# Patient Record
Sex: MALE
[De-identification: names, ages, dates, MRNs, and addresses within clinical notes are randomized per-mention and may not be internally consistent; named-entity substitution may affect disease eponyms.]

---

## 2018-12-18 ENCOUNTER — HOSPITAL ENCOUNTER (EMERGENCY)
Dept: HOSPITAL 97 - ER | Age: 43
Discharge: HOME | End: 2018-12-18
Payer: SELF-PAY

## 2018-12-18 DIAGNOSIS — Z23: ICD-10-CM

## 2018-12-18 DIAGNOSIS — S60.414A: ICD-10-CM
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
## 2018-12-18 DIAGNOSIS — S60.141A: ICD-10-CM

## 2018-12-18 DIAGNOSIS — S62.634A: Primary | ICD-10-CM

## 2018-12-18 DIAGNOSIS — E11.9: ICD-10-CM
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
## 2018-12-18 PROCEDURE — 0H9QXZZ DRAINAGE OF FINGER NAIL, EXTERNAL APPROACH: ICD-10-PCS

## 2018-12-18 PROCEDURE — 96372 THER/PROPH/DIAG INJ SC/IM: CPT

## 2018-12-18 PROCEDURE — 90714 TD VACC NO PRESV 7 YRS+ IM: CPT

## 2018-12-18 PROCEDURE — 99283 EMERGENCY DEPT VISIT LOW MDM: CPT

## 2018-12-18 NOTE — RAD REPORT
EXAM DESCRIPTION:  RAD - Hand Right 3 View - 12/18/2018 2:35 pm

 

CLINICAL HISTORY:  Left hand pain, smash injury to the distal fourth digit

 

COMPARISON:  None.

 

FINDINGS:  Tuft of the fourth distal phalanx shows a fracture with no distraction or angulation. IP j
oints of the fourth digit are normal. Soft tissue swelling is present in the distal finger with no ai
r or foreign body.

 

 Elsewhere no additional fracture changes seen. No bone or joint acute finding.

 

IMPRESSION:  Fourth digit tuft fracture with no distraction or angulation.

 

No foreign body in the soft tissues.

## 2018-12-18 NOTE — ER
Nurse's Notes                                                                                     

 [facility]                                                                

Name: Stan Meade                                                                               

Age: 43 yrs                                                                                       

Sex: Male                                                                                         

: 1975                                                                                   

MRN: [id number]                                                                                   

Arrival Date: 2018                                                                          

Time: 13:22                                                                                       

Account#: [id number]                                                                            

Bed 12                                                                                            

Private MD: Out, [facility]                                                                    

Diagnosis: Abrasion of right ring finger;Contusion of right ring finger with damage to nail;distal

  tuft fracture right ring finger                                                                 

                                                                                                  

Presentation:                                                                                     

                                                                                             

13:34 Presenting complaint: Patient states: smashed ring finger on right hand between a door  iw  

      and a piece of iron, happened about an hour ago, swelling and bruising noted to finger      

      nail, bleeding is controlled. Transition of care: patient was not received from another     

      setting of care. Onset of symptoms was 2018. Risk Assessment: Do you want      

      to hurt yourself or someone else? Patient reports no desire to harm self or others.         

      Initial Sepsis Screen: Does the patient meet any 2 criteria? No. Patient's initial          

      sepsis screen is negative. Does the patient have a suspected source of infection? No.       

      Patient's initial sepsis screen is negative. Care prior to arrival: None.                   

13:34 Method Of Arrival: Ambulatory                                                           iw  

13:34 Acuity: JONE 4                                                                           iw  

                                                                                                  

Historical:                                                                                       

- Allergies:                                                                                      

13:37 NKA;                                                                                    iw  

- Home Meds:                                                                                      

13:37 Janumet  mg oral tab 1 tab 2 times per day [Active];                              iw  

- PMHx:                                                                                           

13:37 Diabetes - NIDDM;                                                                       iw  

- PSHx:                                                                                           

13:37 None;                                                                                   iw  

                                                                                                  

- Immunization history:: Adult Immunizations not up to date, Last tetanus immunization:           

  unknown.                                                                                        

- Social history:: Smoking status: Patient/guardian denies using tobacco.                         

- Ebola Screening: : Patient negative for fever greater than or equal to 101.5 degrees            

  Fahrenheit, and additional compatible Ebola Virus Disease symptoms Patient denies               

  exposure to infectious person Patient denies travel to an Ebola-affected area in the            

  21 days before illness onset No symptoms or risks identified at this time.                      

                                                                                                  

                                                                                                  

Screenin:41 Abuse screen: Denies threats or abuse. Denies injuries from another. Nutritional        iw  

      screening: No deficits noted. Tuberculosis screening: No symptoms or risk factors           

      identified. Fall Risk None identified.                                                      

                                                                                                  

Assessment:                                                                                       

14:00 General: Appears in no apparent distress. Behavior is calm, cooperative. Pain:          iw  

      Complains of pain in dorsal aspect of distal phalanx of right ring finger. Neuro: Level     

      of Consciousness is awake, alert, obeys commands, Oriented to person, place, time,          

      situation, Moves all extremities. Full function. Derm: Skin is healthy with good            

      turgor. Musculoskeletal: Range of motion: limited in dorsal aspect of distal phalanx of     

      right ring finger. Injury Description: Crush injury sustained to dorsal aspect of           

      distal phalanx of right ring finger was sustained 30-60 minutes ago.                        

                                                                                                  

Vital Signs:                                                                                      

13:37  / 79; Pulse 81; Resp 16; Pulse Ox 98% on R/A; Weight 86.18 kg; Height 5 ft. 9    iw  

      in. (175.26 cm); Pain 0/10;                                                                 

13:37 Body Mass Index 28.06 (86.18 kg, 175.26 cm)                                             iw  

                                                                                                  

ED Course:                                                                                        

13:22 Patient arrived in ED.                                                                  sb2 

13:22 Out, [facility] is Private Physician.                                                      sb2 

13:32 Skylar Cramer, RN is Primary Nurse.                                                   iw  

13:36 Triage completed.                                                                       iw  

13:38 Arm band placed on.                                                                     iw  

13:39 Joseph Sainz NP is PHCP.                                                           pm1 

13:39 Leroy Harden MD is Attending Physician.                                             pm1 

14:33 X-ray completed. Portable x-ray completed in exam room. Patient tolerated procedure     ml  

      well.                                                                                       

14:35 Hand Right 3 View XRAY In Process Unspecified.                                          EDMS

15:35 Wound care: to avulsion located on dorsal aspect of distal phalanx of right ring finger iw  

      was cleaned with soap and water, dressed with Neosporin, 4X4s, cling, Patient tolerated     

      well.                                                                                       

15:47 No provider procedures requiring assistance completed. Patient did not have IV access   iw  

      during this emergency room visit.                                                           

                                                                                                  

Administered Medications:                                                                         

13:42 Not Given (Patient Refused): Norco 5 mg-325 mg 1 tabs PO once                           pm1 

14:06 Drug: Tetanus-Diphtheria Toxoid Adult 0.5 ml {: Inspirotec. Exp:         iw  

      2021. Lot #: [id number]. } Route: IM; Site: right deltoid;                                 

15:00 Follow up: Response: No adverse reaction                                                iw  

15:28 Drug: Ancef 1 grams Route: IM; Site: left gluteus;                                      iw  

15:48 Follow up: Response: No adverse reaction                                                iw  

                                                                                                  

                                                                                                  

Outcome:                                                                                          

15:04 Discharge ordered by MD.                                                                pm1 

15:48 Discharged to home ambulatory.                                                          iw  

15:48 Condition: good                                                                             

15:48 Discharge instructions given to patient, Instructed on discharge instructions, follow       

      up and referral plans. medication usage, Demonstrated understanding of instructions,        

      follow-up care, medications, Prescriptions given X 1.                                       

15:48 Patient left the ED.                                                                    iw  

                                                                                                  

Signatures:                                                                                       

Dispatcher [employer]                           EDSkylar Barrera RN                     RN   Cathy Gallardo Patrick, NP                    NP   pm1                                                  

Selena Gamez                               sb2                                                  

                                                                                                  

Corrections: (The following items were deleted from the chart)                                    

13:38 13:34 Presenting complaint: Patient states: smashed ring finger on right hand about an  iw  

      hour ago, swelling and bruising noted to finger nail, bleeding is controlled iw             

                                                                                                  

**************************************************************************************************

## 2018-12-18 NOTE — EDPHYS
Physician Documentation                                                                           

 [facility]                                                                

Name: Stan Meade                                                                               

Age: 43 yrs                                                                                       

Sex: Male                                                                                         

: 1975                                                                                   

MRN: [id number]                                                                                   

Arrival Date: 2018                                                                          

Time: 13:22                                                                                       

Account#: [id number]                                                                            

Bed 12                                                                                            

Private MD: Out, [facility]                                                                    

ED Physician Leroy Harden                                                                      

HPI:                                                                                              

                                                                                             

13:53 This 43 yrs old  Male presents to ER via Ambulatory with complaints of Right    pm1 

      ring Finger Injury.                                                                         

13:53 The patient or guardian reports pain. The complaints affect the dorsal aspect of distal pm1 

      phalanx of right ring finger. Context: resulted from a crush injury, Two pieces of          

      metal. Onset: The symptoms/episode began/occurred just prior to arrival. Modifying          

      factors: The symptoms are alleviated by nothing, the symptoms are aggravated by             

      nothing. Associated signs and symptoms: Pertinent negatives: cyanosis distally,             

      decreased sensation distally, numbness distally, tingling distally. The patient has not     

      experienced similar symptoms in the past. The patient has not recently seen a physician.    

                                                                                                  

Historical:                                                                                       

- Allergies:                                                                                      

13:37 NKA;                                                                                    iw  

- Home Meds:                                                                                      

13:37 Janumet  mg oral tab 1 tab 2 times per day [Active];                              iw  

- PMHx:                                                                                           

13:37 Diabetes - NIDDM;                                                                       iw  

- PSHx:                                                                                           

13:37 None;                                                                                   iw  

                                                                                                  

- Immunization history:: Adult Immunizations not up to date, Last tetanus immunization:           

  unknown.                                                                                        

- Social history:: Smoking status: Patient/guardian denies using tobacco.                         

- Ebola Screening: : Patient negative for fever greater than or equal to 101.5 degrees            

  Fahrenheit, and additional compatible Ebola Virus Disease symptoms Patient denies               

  exposure to infectious person Patient denies travel to an Ebola-affected area in the            

  21 days before illness onset No symptoms or risks identified at this time.                      

                                                                                                  

                                                                                                  

ROS:                                                                                              

14:00 Constitutional: Negative for fever, chills, and weight loss, Eyes: Negative for injury, pm1 

      pain, redness, and discharge, ENT: Negative for injury, pain, and discharge, Neck:          

      Negative for injury, pain, and swelling, Cardiovascular: Negative for chest pain,           

      palpitations, and edema, Respiratory: Negative for shortness of breath, cough,              

      wheezing, and pleuritic chest pain, Abdomen/GI: Negative for abdominal pain, nausea,        

      vomiting, diarrhea, and constipation, Back: Negative for injury and pain.                   

14:00 Neuro: Negative for headache, weakness, numbness, tingling, and seizure.                    

14:00 MS/extremity: Positive for abrasion, pain, of the dorsal aspect of distal phalanx of        

      right ring finger, Negative for decreased range of motion, deformity.                       

14:00 Skin: Positive for abrasion(s), of the dorsal aspect of distal phalanx of right ring        

      finger.                                                                                     

                                                                                                  

Exam:                                                                                             

14:00 Constitutional:  This is a well developed, well nourished patient who is awake, alert,  pm1 

      and in no acute distress. Head/Face:  Normocephalic, atraumatic. Neck:  Trachea             

      midline, no thyromegaly or masses palpated, and no cervical lymphadenopathy.  Supple,       

      full range of motion without nuchal rigidity, or vertebral point tenderness.  No            

      Meningismus. Chest/axilla:  Normal chest wall appearance and motion.  Nontender with no     

      deformity.  No lesions are appreciated. Cardiovascular:  Regular rate and rhythm with a     

      normal S1 and S2.  No gallops, murmurs, or rubs.  Normal PMI, no JVD.  No pulse             

      deficits. Respiratory:  Lungs have equal breath sounds bilaterally, clear to                

      auscultation and percussion.  No rales, rhonchi or wheezes noted.  No increased work of     

      breathing, no retractions or nasal flaring. Abdomen/GI:  Soft, non-tender, with normal      

      bowel sounds.  No distension or tympany.  No guarding or rebound.  No evidence of           

      tenderness throughout. Back:  No spinal tenderness.  No costovertebral tenderness.          

      Full range of motion.                                                                       

14:00 MS/ Extremity:  Pulses equal, no cyanosis.  Neurovascular intact.  Full, normal range       

      of motion.                                                                                  

14:00 Skin: Appearance: normal except for affected area, injury, abrasion(s), small abrasion      

      noted, of the dorsal aspect of distal phalanx of right ring finger.                         

14:00 Neuro: Orientation: is normal, Motor: is normal, moves all fours, Sensation: is normal,     

      no obvious gross deficits, Gait: is steady, at a normal pace, without difficulty.           

                                                                                                  

Vital Signs:                                                                                      

13:37  / 79; Pulse 81; Resp 16; Pulse Ox 98% on R/A; Weight 86.18 kg; Height 5 ft. 9    iw  

      in. (175.26 cm); Pain 0/10;                                                                 

13:37 Body Mass Index 28.06 (86.18 kg, 175.26 cm)                                             iw  

                                                                                                  

Procedures:                                                                                       

14:56 Performed Trephination of right fourth finger nail with Bovie pen. Patient tolerated    pm1 

      well.                                                                                       

                                                                                                  

MDM:                                                                                              

13:42 Patient medically screened.                                                             pm1 

14:56 Data reviewed: vital signs. Data interpreted: Pulse oximetry: on room air is 98 %.      pm1 

      Interpretation: normal. Counseling: I had a detailed discussion with the patient and/or     

      guardian regarding: the historical points, exam findings, and any diagnostic results        

      supporting the discharge/admit diagnosis, radiology results, the need for outpatient        

      follow up, to return to the emergency department if symptoms worsen or persist or if        

      there are any questions or concerns that arise at home.                                     

                                                                                                  

                                                                                             

13:40 Order name: Hand Right 3 View XRAY; Complete Time: 15:13                                pm1 

                                                                                             

14:56 Order name: Wound dressing; Complete Time: 15:14                                        pm1 

                                                                                             

15:18 Order name: Finger Splint; Complete Time: 15:28                                         pm1 

                                                                                                  

Administered Medications:                                                                         

13:42 Not Given (Patient Refused): Norco 5 mg-325 mg 1 tabs PO once                           pm1 

14:06 Drug: Tetanus-Diphtheria Toxoid Adult 0.5 ml {: [employer]. Exp:         iw  

      2021. Lot #: [id number]. } Route: IM; Site: right deltoid;                                 

15:00 Follow up: Response: No adverse reaction                                                iw  

15:28 Drug: Ancef 1 grams Route: IM; Site: left gluteus;                                      iw  

15:48 Follow up: Response: No adverse reaction                                                iw  

                                                                                                  

                                                                                                  

Disposition:                                                                                      

                                                                                             

06:39 Co-signature as Attending Physician, Leroy Harden MD I agree with the assessment and  liam 

      plan of care.                                                                               

                                                                                                  

Disposition:                                                                                      

18 15:04 Discharged to Home. Impression: Contusion of right ring finger with damage to      

  nail, Abrasion of right ring finger, distal tuft fracture right ring finger.                    

- Condition is Stable.                                                                            

- Discharge Instructions: Abrasion, Finger Fracture.                                              

- Prescriptions for Keflex 500 mg Oral Capsule - take 1 capsule by ORAL route every 6             

  hours for 10 days; 40 capsule.                                                                  

- Work release form, Medication Reconciliation Form, Thank You Letter, Antibiotic                 

  Education form.                                                                                 

- Follow up: Emergency Department; When: As needed; Reason: Worsening of condition.               

  Follow up: Private Physician; When: 2 - 3 days; Reason: Recheck today's complaints,             

  Continuance of care, Re-evaluation by your physician.                                           

- Problem is new.                                                                                 

- Symptoms have improved.                                                                         

                                                                                                  

                                                                                                  

                                                                                                  

Signatures:                                                                                       

Dispatcher [employer]                           EDLeroy Ghosh MD MD cha Williams, Irene, RN                     RN   iw                                                   

Joseph Sainz, JESSICA                    NP   pm1                                                  

                                                                                                  

Corrections: (The following items were deleted from the chart)                                    

                                                                                             

15:15 15:04 2018 15:04 Discharged to Home. Impression: Contusion of right ring finger   pm1 

      with damage to nailAbrasion of right ring finger. Condition is Stable. Forms are            

      Medication Reconciliation Form, Thank You Letter, Antibiotic Education, Prescription        

      Opioid Use. Follow up: Emergency Department; When: As needed; Reason: Worsening of          

      condition. Follow up: Private Physician; When: 2 - 3 days; Reason: Recheck today's          

      complaints, Continuance of care, Re-evaluation by your physician. Problem is new.           

      Symptoms have improved. pm1                                                                 

15:48 15:15 2018 15:04 Discharged to Home. Impression: Contusion of right ring finger   iw  

      with damage to nailAbrasion of right ring finger; distal tuft fracture right ring           

      finger. Condition is Stable. Discharge Instructions: Hand Contusion, Abrasion.              

      Prescriptions for Keflex 500 mg Oral Capsule - take 1 capsule by ORAL route every 6         

      hours for 10 days; 40 capsule. and Forms are Medication Reconciliation Form, Thank You      

      Letter, Antibiotic Education. Follow up: Emergency Department; When: As needed; Reason:     

      Worsening of condition. Follow up: Private Physician; When: 2 - 3 days; Reason: Recheck     

      today's complaints, Continuance of care, Re-evaluation by your physician. Problem is        

      new. Symptoms have improved. pm1                                                            

                                                                                                  

**************************************************************************************************